# Patient Record
Sex: FEMALE | Race: ASIAN | NOT HISPANIC OR LATINO | URBAN - METROPOLITAN AREA
[De-identification: names, ages, dates, MRNs, and addresses within clinical notes are randomized per-mention and may not be internally consistent; named-entity substitution may affect disease eponyms.]

---

## 2018-07-05 LAB
ABORH_EXT (HISTORICAL CONVERSION): NORMAL
ANTIBODY_EXT (HISTORICAL CONVERSION): NORMAL
HBSAG_EXT (HISTORICAL CONVERSION): NORMAL
HGB_EXT (HISTORICAL CONVERSION): 12.9
HGB_EXT (HISTORICAL CONVERSION): 13.8
HIV_EXT: NEGATIVE
PLT_EXT - HISTORICAL: 234
RPR - HISTORICAL: NORMAL
RUBELLA_EXT (HISTORICAL CONVERSION): NORMAL

## 2018-11-27 LAB — HGB_EXT (HISTORICAL CONVERSION): 12

## 2019-01-26 LAB — GBS_EXT (HISTORICAL CONVERSION): NEGATIVE

## 2019-02-15 ENCOUNTER — HOSPITAL ENCOUNTER (OUTPATIENT)
Dept: OBGYN | Facility: HOSPITAL | Age: 24
Discharge: HOME | End: 2019-02-15
Attending: OBSTETRICS & GYNECOLOGY | Admitting: OBSTETRICS & GYNECOLOGY

## 2019-02-15 ASSESSMENT — MIFFLIN-ST. JEOR: SCORE: 1401.53

## 2019-02-16 ENCOUNTER — HOSPITAL ENCOUNTER (OUTPATIENT)
Dept: OBGYN | Facility: HOSPITAL | Age: 24
Discharge: HOME | End: 2019-02-16
Attending: OBSTETRICS & GYNECOLOGY | Admitting: OBSTETRICS & GYNECOLOGY

## 2019-02-16 ASSESSMENT — MIFFLIN-ST. JEOR
SCORE: 1393.6
SCORE: 1384.53

## 2019-02-18 ENCOUNTER — ANESTHESIA - HEALTHEAST (OUTPATIENT)
Dept: OBGYN | Facility: HOSPITAL | Age: 24
End: 2019-02-18

## 2019-02-20 ENCOUNTER — HOME CARE/HOSPICE - HEALTHEAST (OUTPATIENT)
Dept: HOME HEALTH SERVICES | Facility: HOME HEALTH | Age: 24
End: 2019-02-20

## 2019-02-21 ENCOUNTER — HOME CARE/HOSPICE - HEALTHEAST (OUTPATIENT)
Dept: HOME HEALTH SERVICES | Facility: HOME HEALTH | Age: 24
End: 2019-02-21

## 2021-01-31 ENCOUNTER — COMMUNICATION - HEALTHEAST (OUTPATIENT)
Dept: SCHEDULING | Facility: CLINIC | Age: 26
End: 2021-01-31

## 2021-01-31 ENCOUNTER — ANESTHESIA - HEALTHEAST (OUTPATIENT)
Dept: OBGYN | Facility: HOSPITAL | Age: 26
End: 2021-01-31

## 2021-01-31 ENCOUNTER — RECORDS - HEALTHEAST (OUTPATIENT)
Dept: ADMINISTRATIVE | Facility: OTHER | Age: 26
End: 2021-01-31

## 2021-02-01 ENCOUNTER — HOME CARE/HOSPICE - HEALTHEAST (OUTPATIENT)
Dept: HOME HEALTH SERVICES | Facility: HOME HEALTH | Age: 26
End: 2021-02-01

## 2021-06-02 VITALS — HEIGHT: 63 IN | BODY MASS INDEX: 26.58 KG/M2 | WEIGHT: 150 LBS

## 2021-06-02 VITALS — WEIGHT: 152 LBS | BODY MASS INDEX: 25.95 KG/M2 | HEIGHT: 64 IN

## 2021-06-14 NOTE — ANESTHESIA POSTPROCEDURE EVALUATION
Patient: Jeniffer Haynes  * No procedures listed *  Anesthesia type: epidural    Patient location: Labor and Delivery  Last vitals: No vitals data found for the desired time range.    Post vital signs: stable  Level of consciousness: awake and responds to simple questions  Post-anesthesia pain: pain controlled  Post-anesthesia nausea and vomiting: no  Pulmonary: unassisted, return to baseline  Cardiovascular: stable and blood pressure at baseline  Hydration: adequate  Anesthetic events: no    QCDR Measures:  ASA# 11 - Emeilna-op Cardiac Arrest: ASA11B - Patient did NOT experience unanticipated cardiac arrest  ASA# 12 - Emelina-op Mortality Rate: ASA12B - Patient did NOT die  ASA# 13 - PACU Re-Intubation Rate: NA - No ETT / LMA used for case  ASA# 10 - Composite Anes Safety: ASA10A - No serious adverse event    Additional Notes:

## 2021-06-14 NOTE — ANESTHESIA PREPROCEDURE EVALUATION
Anesthesia Evaluation      Patient summary reviewed   No history of anesthetic complications     Airway   Mallampati: II  Neck ROM: full   Pulmonary    (-) asthma, not a smoker                         Cardiovascular   Exercise tolerance: > or = 4 METS  (-) hypertension  ECG reviewed        Neuro/Psych    (-) no seizures, no CVA    Endo/Other    (+) pregnant  (-) no diabetes, hypothyroidism, no obesity     GI/Hepatic/Renal    (+) GERD,        Other findings:  at term SOL. Otherwise uncomplicated pregnancy.      Dental      Comment: Good dentition, no loose or removable teeth                         Anesthesia Plan  Planned anesthetic: epidural  Patient requests labor epidural. Chart reviewed, including labs. Reviewed options and risks with the patient, including but not limited to: bleeding, infection, damage to tissues under the skin (nerves, muscles, blood vessels), hypotension, headache, and epidural failure. Questions answered, consent signed. Patient agrees to elective labor epidural.     ASA 2     Anesthetic plan and risks discussed with: patient    Post-op plan: epidural analgesia

## 2021-06-14 NOTE — ANESTHESIA PROCEDURE NOTES
Epidural Block    Patient location during procedure: OB  Time Called: 1/31/2021 5:50 AM  Reason for Block:labor epidural  Staffing:  Performing  Anesthesiologist: Shanda Mckee MD  Preanesthetic Checklist  Completed: patient identified, risks, benefits, and alternatives discussed, timeout performed, consent obtained, at patient's request, airway assessed, oxygen available, suction available, emergency drugs available and hand hygiene performed  Procedure  Patient position: sitting  Prep: ChloraPrep  Patient monitoring: continuous pulse oximetry, heart rate and blood pressure  Approach: midline  Location: L3-L4  Injection technique: ANIRUDH air and ANIRUDH saline  Number of Attempts:1  Needle  Needle type: Darin   Needle gauge: 18 G     Catheter in Space: 5  Assessment  Sensory level:  No complications      Additional Notes:  ANIRUDH at 4.5 cm. Catheter at 9 cm.

## 2021-06-16 PROBLEM — Z34.90 PREGNANT: Status: ACTIVE | Noted: 2019-02-17

## 2021-06-24 NOTE — PROGRESS NOTES
23 y/o g1 at 40.1 weeks gest with edc of 2-4-19 (per pt report) presents to [facility] at 1915 with reports of cxns all day. States they became closer, 3-4 min apart about 30  about 1800. Denies and SROM and VB. +FM. Denies complications with pregnancy. Reports being told she is GBS neg.   Was in clinic on Tues and was told her cervix was a little open.   Here with . Monitors applied and POC discussed. Pt reports feeling cxns low in abd. Lasting 30 sec and rates them as 6/10. Keeping hydrated. Last ate at 1800. LBM 2-14-19. Will cont to monitor.Chetna Danielle

## 2021-06-24 NOTE — ANESTHESIA POSTPROCEDURE EVALUATION
Patient: Jeniffer Haynes  * No procedures listed *  Anesthesia type: epidural    Patient location: OB 8  Last vitals:   Vitals:    02/18/19 2341   BP: 102/61   Pulse: (!) 110   Resp: 18   Temp: 37.6  C (99.6  F)   SpO2: 99%   Pt satisfied with LE, has ambulated.  Post vital signs: stable  Level of consciousness: alert  Post-anesthesia pain: pain controlled  Post-anesthesia nausea and vomiting: no  Pulmonary: room air  Cardiovascular: stable and blood pressure at baseline  Hydration: adequate  Anesthetic events: no    QCDR Measures:  ASA# 11 - Emelina-op Cardiac Arrest: ASA11B - Patient did NOT experience unanticipated cardiac arrest  ASA# 12 - Emelina-op Mortality Rate: ASA12B - Patient did NOT die  ASA# 13 - PACU Re-Intubation Rate: ASA13B - Patient did NOT require a new airway mgmt  ASA# 10 - Composite Anes Safety: ASA10A - No serious adverse event    Additional Notes:

## 2021-06-24 NOTE — PROGRESS NOTES
Rechecked cervix no change noted went over discharge instructions pt. Acknowledged understanding lives 5 min from hospital will go home to rest.Appointment on monday

## 2021-06-24 NOTE — PROGRESS NOTES
Dr. Ashby on unit. Notified of pt arrival and status, including C/C. Sve, reactive NST and up walking. Will repeat sve at 2115 and MD stated it no change, D/c to home.Chetna Danielle

## 2021-06-24 NOTE — ANESTHESIA PREPROCEDURE EVALUATION
Anesthesia Evaluation      Patient summary reviewed   No history of anesthetic complications     Airway    Pulmonary    (-) asthma, shortness of breath, recent URI, sleep apnea, not a smoker                         Cardiovascular   Exercise tolerance: > or = 4 METS  (-) hypertension, past MI, CAD, angina, CHF  ECG reviewed        Neuro/Psych    (-) no seizures, no CVA, no depression, no anxiety/panic attacks, no dementia, no chronic pain    Endo/Other    (+) pregnant  (-) no diabetes, hypothyroidism     GI/Hepatic/Renal    (+) GERD,        Other findings:  requesting LE for analgesia.      Dental                         Anesthesia Plan  Planned anesthetic: epidural    ASA 2     Anesthetic plan and risks discussed with: patient    Post-op plan: routine recovery

## 2021-06-24 NOTE — PROGRESS NOTES
No change in cervix after 1.5hrs. Pt and  agreeable to d/c. Written and verbal d/c instructions given re: digns of labor, SROM, VB and  FM. Instructed to keep OB appts and call provider with questions or concerns. Pt and hus band verbalized  Understanding and amb from unit at this time in stable, undelivered condition.Chetna Dainelle